# Patient Record
Sex: MALE | Employment: FULL TIME | URBAN - METROPOLITAN AREA
[De-identification: names, ages, dates, MRNs, and addresses within clinical notes are randomized per-mention and may not be internally consistent; named-entity substitution may affect disease eponyms.]

---

## 2017-10-07 ENCOUNTER — HOSPITAL ENCOUNTER (EMERGENCY)
Facility: CLINIC | Age: 27
Discharge: HOME | End: 2017-10-07
Attending: EMERGENCY MEDICINE | Admitting: EMERGENCY MEDICINE
Payer: COMMERCIAL

## 2017-10-07 VITALS
RESPIRATION RATE: 18 BRPM | TEMPERATURE: 98.1 F | OXYGEN SATURATION: 94 % | HEART RATE: 100 BPM | DIASTOLIC BLOOD PRESSURE: 80 MMHG | SYSTOLIC BLOOD PRESSURE: 105 MMHG
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
## 2017-10-07 DIAGNOSIS — T15.91XA FOREIGN BODY, EYE, RIGHT, INITIAL ENCOUNTER: ICD-10-CM

## 2017-10-07 PROCEDURE — 99283 EMERGENCY DEPT VISIT LOW MDM: CPT | Mod: Z6 | Performed by: EMERGENCY MEDICINE

## 2017-10-07 PROCEDURE — 99283 EMERGENCY DEPT VISIT LOW MDM: CPT | Performed by: EMERGENCY MEDICINE

## 2017-10-07 PROCEDURE — 25000125 ZZHC RX 250

## 2017-10-07 RX ORDER — MINERAL OIL, PETROLATUM 425; 573 MG/G; MG/G
OINTMENT OPHTHALMIC
Qty: 1 TUBE | Refills: 0 | Status: SHIPPED | OUTPATIENT
Start: 2017-10-07 | End: 2019-05-05

## 2017-10-07 RX ORDER — PROPARACAINE HYDROCHLORIDE 5 MG/ML
1 SOLUTION/ DROPS OPHTHALMIC ONCE
Status: COMPLETED | OUTPATIENT
Start: 2017-10-07 | End: 2017-10-07

## 2017-10-07 RX ORDER — PROPARACAINE HYDROCHLORIDE 5 MG/ML
SOLUTION/ DROPS OPHTHALMIC
Status: COMPLETED
Start: 2017-10-07 | End: 2017-10-07

## 2017-10-07 RX ADMIN — PROPARACAINE HYDROCHLORIDE 1 DROP: 5 SOLUTION/ DROPS OPHTHALMIC at 17:51

## 2017-10-07 ASSESSMENT — VISUAL ACUITY
OS: 20/20
OD: 20/20
OD: 20/20
OS: 20/20

## 2017-10-07 ASSESSMENT — ENCOUNTER SYMPTOMS: EYE PAIN: 1

## 2017-10-07 NOTE — ED AVS SNAPSHOT
[facility], Emergency Department    [address] [facility] [address]    Phone:  [phone]                                       Moshe Dee   MRN: [id number]    Department:  [facility], Emergency Department   Date of Visit:  10/7/2017           Patient Information     Date Of Birth          1990        Your diagnoses for this visit were:     Foreign body, eye, right, initial encounter        You were seen by Osorio Schwartz MD.        Discharge Instructions       Please make an appointment to follow up with Eye Clinic (phone: [phone]) in 2-3 days if not improving.    Return if worse eye pain or vision change.   Do not Rub your eye.      24 Hour Appointment Hotline       To make an appointment at any [facility] clinic, call [phone] ([phone]). If you don't have a family doctor or clinic, we will help you find one. [facility] clinics are conveniently located to serve the needs of you and your family.             Review of your medicines      START taking        Dose / Directions Last dose taken    REFRESH P.M. Oint   Quantity:  1 Tube        Use in Right eye three times a day to lubricate eye.   Refills:  0                Prescriptions were sent or printed at these locations (1 Prescription)                   Other Prescriptions                Printed at Department/Unit printer (1 of 1)         Artificial Tear Ointment (REFRESH P.M.) OINT                Orders Needing Specimen Collection     None      Pending Results     No orders found from 10/5/2017 to 10/8/2017.            Pending Culture Results     No orders found from 10/5/2017 to 10/8/2017.            Pending Results Instructions     If you had any lab results that were not finalized at the time of your Discharge, you can call the ED Lab Result RN at [phone]. You will be contacted by this team for any positive Lab results or changes in treatment. The nurses are available 7 days a week from 10A to 6:30P.  You  "can leave a message 24 hours per day and they will return your call.        Thank you for choosing [facility]       Thank you for choosing [facility] for your care. Our goal is always to provide you with excellent care. Hearing back from our patients is one way we can continue to improve our services. Please take a few minutes to complete the written survey that you may receive in the mail after you visit with us. Thank you!        [employer] Information     Yhat lets you send messages to your doctor, view your test results, renew your prescriptions, schedule appointments and more. To sign up, go to [email or web address] . Click on \"Log in\" on the left side of the screen, which will take you to the Welcome page. Then click on \"Sign up Now\" on the right side of the page.     You will be asked to enter the access code listed below, as well as some personal information. Please follow the directions to create your username and password.     Your access code is: [email or web address]  Expires: 2018  9:20 PM     Your access code will  in 90 days. If you need help or a new code, please call your [facility] clinic or [phone].        Care EveryWhere ID     This is your Care EveryWhere ID. This could be used by other organizations to access your [facility] medical records  [id number]        Equal Access to Services     LITA TOBIAS : Hadii so Frank, waaxda luqadaha, qaybta kaalmada adecourtneyyada, jorge a quintana . So [facility] [phone].    ATENCIÓN: Si habla español, tiene a luna disposición servicios gratuitos de asistencia lingüística. Llame al [phone].    We comply with applicable federal civil rights laws and [place] laws. We do not discriminate on the basis of race, color, national origin, age, disability, sex, sexual orientation, or gender identity.            After Visit Summary       This is your record. Keep this with you and show to your community pharmacist(s) and doctor(s) " at your next visit.

## 2017-10-07 NOTE — ED AVS SNAPSHOT
[facility], [place], Emergency Department    [address] [place] [address]    Phone:  [phone]                                       Moshe Dee   MRN: [id number]    Department:  [facility], Emergency Department   Date of Visit:  10/7/2017           After Visit Summary Signature Page     I have received my discharge instructions, and my questions have been answered. I have discussed any challenges I see with this plan with the nurse or doctor.    ..........................................................................................................................................  Patient/Patient Representative Signature      ..........................................................................................................................................  Patient Representative Print Name and Relationship to Patient    ..................................................               ................................................  Date                                            Time    ..........................................................................................................................................  Reviewed by Signature/Title    ...................................................              ..............................................  Date                                                            Time

## 2017-10-07 NOTE — ED NOTES
Pt comes in with right eye pain, feeling like there is something in his eye. Tried flushing eye out at home. Alert and oriented, vss. Denies vision changes.

## 2017-10-07 NOTE — ED PROVIDER NOTES
History     Chief Complaint   Patient presents with     Foreign Body in Eye     HPI  Moshe Dee is a 27 year old, otherwise health male who presents with right eye pain and foreign body sensation. Patient reports that he noticed the sensation in his right eye yesterday afternoon, he was not outside. He was unable to find anything in his eye. He notes some pain and no visual changes. He attempted to use warm water to wash out the eye, but was unsuccessful. He has had no drainage. He adds that the sensation moves around his eye.     History reviewed. No pertinent past medical history.    History reviewed. No pertinent surgical history.    Family History   Problem Relation Age of Onset     CANCER No family hx of      No known family hx of skin cancer       Social History   Substance Use Topics     Smoking status: Never Smoker     Smokeless tobacco: Never Used     Alcohol use Not on file       No current facility-administered medications for this encounter.      Current Outpatient Prescriptions   Medication     Artificial Tear Ointment (REFRESH P.M.) OINT      No Known Allergies      I have reviewed the Medications, Allergies, Past Medical and Surgical History, and Social History in the Epic system.    Review of Systems   Eyes: Positive for pain. Negative for visual disturbance.       Physical Exam   BP: 115/70  Heart Rate: 63  Temp: 98  F (36.7  C)  Resp: 16  SpO2: 94 %  Physical Exam   Constitutional: He is oriented to person, place, and time. He appears well-developed and well-nourished. No distress.   HENT:   Head: Normocephalic and atraumatic.   Eyes: EOM are normal. Pupils are equal, round, and reactive to light. Right conjunctiva is not injected. Left conjunctiva is not injected. No scleral icterus.   Slit lamp exam:       The right eye shows no corneal abrasion, no corneal ulcer, no foreign body and no fluorescein uptake.   No foreign body seen on eversion of upper and lower eyelid   Neck: Normal range of  motion. Neck supple.   Neurological: He is alert and oriented to person, place, and time.   Skin: Skin is warm and dry. No rash noted. He is not diaphoretic. No erythema. No pallor.       ED Course   5:25 PM  The patient was seen and examined by Joel Schwartz MD in Room 20.     ED Course     Procedures             Critical Care time:  none             Labs Ordered and Resulted from Time of ED Arrival Up to the Time of Departure from the ED - No data to display         Assessments & Plan (with Medical Decision Making)   The patient has a foreign body sensation in his right upper lid.  The sensation moves around but has not improved.  There is only trace fluorescein uptake on the surface of the cornea.  I was unable to see the foreign body.  After anesthetic with Alcaine dye was irrigated in his symptoms felt significantly better.  He was seen by ophthalmology who is here seeing another patient and they feel that he has likely only a residual feeling from the eye irritation and can use wetting ointment and follow-up as needed.    I have reviewed the nursing notes.    I have reviewed the findings, diagnosis, plan and need for follow up with the patient.    Discharge Medication List as of 10/7/2017  9:20 PM      START taking these medications    Details   Artificial Tear Ointment (REFRESH P.M.) OINT Use in Right eye three times a day to lubricate eye., Disp-1 Tube, R-0, Local Print             Final diagnoses:   Foreign body, eye, right, initial encounter   ILizzy, am serving as a trained medical scribe to document services personally performed by Joel Finn MD, based on the provider's statements to me.      IJoel MD, was physically present and have reviewed and verified the accuracy of this note documented by Lizzy Muhammad.       10/7/2017   [facility], [place], EMERGENCY DEPARTMENT     Osorio Schwartz MD  10/07/17 5729

## 2017-10-08 NOTE — CONSULTS
OPHTHALMOLOGY CONSULT NOTE  10/07/17    Patient: Moshe Dee  Consulted by: ED  Reason for Consult: foreign body sensation right eye     HISTORY OF PRESENTING ILLNESS:     Moshe Dee is a 27 year old male who presents today for foreign body sensation in right eye. Patient explains yesterday around 7 pm began having foreign body sensation in right eye. He tried rubbing his eye with/ mild relief and then went to sleep. Upon awakening foreign body sensation persisted and worsened throughout the day. Patient reports mild eye redness and light sensitivity during this time. He denies trauma, work with metals, or recalling any activity where something may have entered his eye. No other ocular history. Vision has been stable.  Attempts at irrigation have been unsuccessful in ED.           Review of systems were otherwise negative except for that which has been stated above.      OCULAR/MEDICAL/SURGICAL HISTORIES:     Past Ocular History:  None  Emmetropic     History reviewed. No pertinent past medical history.    History reviewed. No pertinent surgical history.    EXAMINATION:     Visual Acuity: Right Eye 20/20 ; Left Eye 20/20  Pupils: Equal and reactive to light and accomodation with no afferent pupillary defect.    Intraocular Pressure: RE  17 ; LE 17  mmHg by tonopen (<5% error).   Motility: RE Full; LE Full      External/Slit Lamp Exam   RIGHT EYE   Lids/Lashes: No Abnormality   Conj/Sclera: trace to 1 + diffuse injection. Small black debri noted on upper lid eversion (removed at slit lamp)    Cornea:  Linear punctate epithelial erosions over temporal cornea, no epithelial defect/staining.    Ant Chamber:  Deep and Quiet   Iris: Round and Reactive   Lens: Clear  LEFT   Lids/lashes: No Abnormality   Conj/Sclera: White and Quiet   Cornea:  Clear   Ant Chamber:  Deep and Quiet   Iris: Round and Reactive   Lens:Clear           ASSESSMENT/PLAN:     Moshe Dee is a 27 year old male who presents with foreign  body in right eye    Plan:  -small amount of foreign material noted under right lid removed at slit lamp. No epithelial defect.   -AT as needed  -AT ointment three times a day in right eye.    -Patient to return to clinic as needed if fails to improve or symptoms worsen.       It is our pleasure to participate in this patient's care and treatment. Please contact us with any further questions or concerns.        Rosa Herron MD  Ophthalmology PGY3

## 2017-10-08 NOTE — DISCHARGE INSTRUCTIONS
Please make an appointment to follow up with Eye Clinic (phone: [phone]) in 2-3 days if not improving.    Return if worse eye pain or vision change.   Do not Rub your eye.

## 2018-12-13 ENCOUNTER — PRE VISIT (OUTPATIENT)
Dept: UROLOGY | Facility: CLINIC | Age: 28
End: 2018-12-13

## 2018-12-13 NOTE — TELEPHONE ENCOUNTER
MEDICAL RECORDS REQUEST   [facility]  Urology Clinic  [address] [place] [address]  PHONE: [phone]  Fax: [phone]        FUTURE VISIT INFORMATION                                                   Moshe Dee, : 1990 scheduled for future visit at [facility]    APPOINTMENT INFORMATION:    Date: 2018    Provider:  GUTIERREZ FREGOSO    Reason for Visit/Diagnosis: FORESKIN TEAR    REFERRAL INFORMATION:    Referring provider:  SELF    Specialty: SELF    Referring providers clinic:  SELF    Clinic contact number:  SELF    RECORDS REQUESTED FOR VISIT                                                     NOTES  STATUS/DETAILS   OFFICE NOTE from referring provider  no   OFFICE NOTE from other specialist  no   DISCHARGE SUMMARY from hospital  no   DISCHARGE REPORT from the ER  no   OPERATIVE REPORT  no   MEDICATION LIST  no       PRE-VISIT CHECKLIST      Record collection complete Yes   Appointment appropriately scheduled           (right time/right provider) Yes   MyChart activation If no, please explain IN PROCESS   Questionnaire complete If no, please explain IN PROCESS     Completed by: Eden Rhodes

## 2018-12-18 ENCOUNTER — OFFICE VISIT (OUTPATIENT)
Dept: UROLOGY | Facility: CLINIC | Age: 28
End: 2018-12-18

## 2018-12-18 VITALS — BODY MASS INDEX: 22.4 KG/M2 | HEIGHT: 72 IN | WEIGHT: 165.34 LBS

## 2018-12-18 DIAGNOSIS — N47.8 FORESKIN PROBLEM: Primary | ICD-10-CM

## 2018-12-18 ASSESSMENT — ENCOUNTER SYMPTOMS
NECK MASS: 0
DECREASED APPETITE: 0
POLYDIPSIA: 0
SMELL DISTURBANCE: 0
POLYPHAGIA: 0
FEVER: 1
HOARSE VOICE: 0
TASTE DISTURBANCE: 0
WEIGHT GAIN: 0
CHILLS: 1
WEIGHT LOSS: 0
NIGHT SWEATS: 1
ALTERED TEMPERATURE REGULATION: 1
SINUS PAIN: 1
SINUS CONGESTION: 1
INCREASED ENERGY: 1
TROUBLE SWALLOWING: 1
HALLUCINATIONS: 0
FATIGUE: 1
SORE THROAT: 1

## 2018-12-18 ASSESSMENT — PAIN SCALES - GENERAL: PAINLEVEL: NO PAIN (0)

## 2018-12-18 ASSESSMENT — MIFFLIN-ST. JEOR: SCORE: 1768.75

## 2018-12-18 NOTE — LETTER
12/18/2018       RE: Moshe Dee  [address]  [address]  [facility] [address]     Dear Colleague,    Thank you for referring your patient, Moshe Dee, to the [facility] UROLOGY AND INST FOR PROSTATE AND UROLOGIC CANCERS at [facility]. Please see a copy of my visit note below.    Urology New Consult H&P    Name: Moshe Dee    MRN: [id number]   YOB: 1990                 Chief Complaint:   Torn penile frenulum          History of Present Illness:   Mr. Moshe Dee is a 28 year old male seen in consultation today  He reports a torn frenulum of the penis. This happened during sexual intercourse a week or two ago.  He is uncircumcised.  It is healing well.         Past Medical History:   none         Past Surgical History:   none         Social History:     Social History     Tobacco Use     Smoking status: Never Smoker     Smokeless tobacco: Never Used   Substance Use Topics     Alcohol use: Not on file            Family History:     Family History   Problem Relation Age of Onset     Cancer No family hx of         No known family hx of skin cancer              Allergies:   No Known Allergies         Medications:     Current Outpatient Medications   Medication Sig     Artificial Tear Ointment (REFRESH P.M.) OINT Use in Right eye three times a day to lubricate eye.     No current facility-administered medications for this visit.           Physical Exam:   Vitals reviewed  General: age-appropriate appearing male in NAD. normal body habitus.  HEENT: Head AT/NC, EOMI, CN Grossly intact  Resp: no respiratory distress, lung sounds clear.  CV: heart rate regular, S1, S2.  Lymph: No cervical, supraclavicular or axillary lymphadenopathy  Back: bony spine is non-tender, flanks are nontender  Abdomen: no obesity , soft, non-distended, non-tender. No organomegaly  : testicles normal without atrophy or masses and uncircumcised penis normal without urethral discharge -  healing frenulum tear. No induration, cellulitis  LE: no edema.   Neuro: grossly intact  Motor: excellent strength throughout  Skin: clear of rashes or ecchymoses.           Assessment and Plan:   28 year old male with torn frenulum  Healing well now.  I encouraged 2 more weeks to allow full healing - avoid sexual intercourse during this time.  followup if symptoms persist    Ko Hamilton MD  December 18, 2018

## 2018-12-18 NOTE — PROGRESS NOTES
Urology New Consult H&P    Name: Moshe Dee    MRN: [id number]   YOB: 1990                 Chief Complaint:   Torn penile frenulum          History of Present Illness:   Mr. Moshe Dee is a 28 year old male seen in consultation today  He reports a torn frenulum of the penis. This happened during sexual intercourse a week or two ago.  He is uncircumcised.  It is healing well.         Past Medical History:   none         Past Surgical History:   none         Social History:     Social History     Tobacco Use     Smoking status: Never Smoker     Smokeless tobacco: Never Used   Substance Use Topics     Alcohol use: Not on file            Family History:     Family History   Problem Relation Age of Onset     Cancer No family hx of         No known family hx of skin cancer              Allergies:   No Known Allergies         Medications:     Current Outpatient Medications   Medication Sig     Artificial Tear Ointment (REFRESH P.M.) OINT Use in Right eye three times a day to lubricate eye.     No current facility-administered medications for this visit.              Review of Systems:    ROS: 14 point ROS neg other than the symptoms noted above in the HPI and PMH.          Physical Exam:   Vitals reviewed  General: age-appropriate appearing male in NAD. normal body habitus.  HEENT: Head AT/NC, EOMI, CN Grossly intact  Resp: no respiratory distress, lung sounds clear.  CV: heart rate regular, S1, S2.  Lymph: No cervical, supraclavicular or axillary lymphadenopathy  Back: bony spine is non-tender, flanks are nontender  Abdomen: no obesity , soft, non-distended, non-tender. No organomegaly  : testicles normal without atrophy or masses and uncircumcised penis normal without urethral discharge - healing frenulum tear. No induration, cellulitis  LE: no edema.   Neuro: grossly intact  Motor: excellent strength throughout  Skin: clear of rashes or ecchymoses.           Assessment and Plan:   28 year old  male with torn frenulum  Healing well now.  I encouraged 2 more weeks to allow full healing - avoid sexual intercourse during this time.  followup if symptoms persist    Ko Hamilton MD  December 18, 2018

## 2019-05-05 ENCOUNTER — HOSPITAL ENCOUNTER (EMERGENCY)
Facility: CLINIC | Age: 29
Discharge: HOME | End: 2019-05-05
Attending: EMERGENCY MEDICINE | Admitting: EMERGENCY MEDICINE

## 2019-05-05 ENCOUNTER — APPOINTMENT (OUTPATIENT)
Dept: GENERAL RADIOLOGY | Facility: CLINIC | Age: 29
End: 2019-05-05
Attending: EMERGENCY MEDICINE

## 2019-05-05 VITALS
OXYGEN SATURATION: 99 % | HEART RATE: 67 BPM | SYSTOLIC BLOOD PRESSURE: 119 MMHG | DIASTOLIC BLOOD PRESSURE: 73 MMHG | WEIGHT: 165.34 LBS | RESPIRATION RATE: 12 BRPM | BODY MASS INDEX: 22.4 KG/M2 | TEMPERATURE: 98.3 F | HEIGHT: 72 IN

## 2019-05-05 DIAGNOSIS — J11.1 INFLUENZA-LIKE SYNDROME: ICD-10-CM

## 2019-05-05 DIAGNOSIS — J11.1 INFLUENZA-LIKE ILLNESS: ICD-10-CM

## 2019-05-05 LAB
DEPRECATED S PYO AG THROAT QL EIA: NORMAL
SPECIMEN SOURCE: NORMAL

## 2019-05-05 PROCEDURE — 87081 CULTURE SCREEN ONLY: CPT | Performed by: EMERGENCY MEDICINE

## 2019-05-05 PROCEDURE — 87880 STREP A ASSAY W/OPTIC: CPT | Performed by: EMERGENCY MEDICINE

## 2019-05-05 PROCEDURE — 99284 EMERGENCY DEPT VISIT MOD MDM: CPT | Mod: 25 | Performed by: EMERGENCY MEDICINE

## 2019-05-05 PROCEDURE — 99283 EMERGENCY DEPT VISIT LOW MDM: CPT | Mod: Z6 | Performed by: EMERGENCY MEDICINE

## 2019-05-05 PROCEDURE — 71046 X-RAY EXAM CHEST 2 VIEWS: CPT

## 2019-05-05 ASSESSMENT — ENCOUNTER SYMPTOMS
DIARRHEA: 0
NECK PAIN: 0
VOMITING: 0
COUGH: 1
HEADACHES: 1
DYSURIA: 0
ABDOMINAL PAIN: 0
SHORTNESS OF BREATH: 1
NAUSEA: 0
FEVER: 1
MYALGIAS: 1
SORE THROAT: 1
APPETITE CHANGE: 0
BLOOD IN STOOL: 0

## 2019-05-05 ASSESSMENT — MIFFLIN-ST. JEOR: SCORE: 1762.51

## 2019-05-05 NOTE — DISCHARGE INSTRUCTIONS
Please make an appointment to [facility] with [facility] (phone: [phone] in 5-7 days if not improving.    Use acetaminophen 1000 mg every 6 hours or ibuprofen 600 mg every 6 hours for pain control.      If you have any worsening symptoms, return to the emergency department for re-evaluation.

## 2019-05-05 NOTE — ED PROVIDER NOTES
[facility] EMERGENCY DEPARTMENT ([facility])  5/05/19    History     Chief Complaint   Patient presents with     Fever     Cough     The history is provided by the patient.     Moshe Dee is a 28 year old male with no significant past medical history who presents to the Emergency Department today due to subjective fevers and cough. Patient reports that he has had subjective fevers on/off since 4/30/2019.  He reports feeling much better 2 nights ago but not 100%. He woke up yesterday to worsening symptoms again. He reports he developed a cough, headache, myalgias and throat pain while coughing. He notes he has no pain while swallowing and is able to eat and drink without any difficulty.  He states that he has had some congestion. He denies nausea, vomiting, abdominal pain, diarrhea, blood in stool, dysuria, rash, change in appetite or neck pain. He denies smoking and no IVDU. Patient is concerned about his symptoms due to an upcoming business trip to [place]. Patient last took Ibuprofen for his symptoms around 8 AM.    I have reviewed the Medications, Allergies, Past Medical and Surgical History, and Social History in the [employer] system.    PAST MEDICAL HISTORY: History reviewed. No pertinent past medical history.    PAST SURGICAL HISTORY: History reviewed. No pertinent surgical history.    FAMILY HISTORY:   Family History   Problem Relation Age of Onset     Cancer No family hx of         No known family hx of skin cancer       SOCIAL HISTORY:   Social History     Tobacco Use     Smoking status: Never Smoker     Smokeless tobacco: Never Used   Substance Use Topics     Alcohol use: Yes     Alcohol/week: 0.0 oz     Comment: occ     No current facility-administered medications for this encounter.      No current outpatient medications on file.        Allergies   Allergen Reactions     Dayquil [Ra Daytime Cold-Flu] Anxiety     Nyquil Cold & [Night-Time Cold-Flu Relief] Anxiety         Review of Systems  "  Constitutional: Positive for fever (Subjective). Negative for appetite change.   HENT: Positive for congestion and sore throat (With cough).    Respiratory: Positive for cough and shortness of breath.    Gastrointestinal: Negative for abdominal pain, blood in stool, diarrhea, nausea and vomiting.   Genitourinary: Negative for dysuria.   Musculoskeletal: Positive for myalgias. Negative for neck pain.   Skin: Negative for rash.   Neurological: Positive for headaches.   All other systems reviewed and are negative.      Physical Exam   BP: 120/83  Pulse: 69  Heart Rate: 81  Temp: 98.1  F (36.7  C)  Resp: 15  Height: 182 cm (5' 11.65\")  Weight: 75 kg (165 lb 5.5 oz)  SpO2: 97 %      Physical Exam   General: patient is alert and oriented and in no acute distress   Head: atraumatic and normocephalic   EENT: moist mucus membranes with tonsillar erythema, no exudates, he has no peritonsillar swelling, no trismus, postnasal drainage noted in the posterior oropharynx, pupils round and reactive, TMs pearly gray bilaterally without erythema or bulging  Neck: supple with full ROM, no meningismus, no induration of the submandibular tissue  Cardiovascular: regular rate and rhythm, extremities warm and well perfused, no lower extremity edema  Pulmonary: lungs clear to auscultation bilaterally, symmetric without wheezing  Abdomen: soft, non-tender, nondistended  Musculoskeletal: normal range of motion   Neurological: alert and oriented, moving all extremities symmetrically, gait normal   Skin: warm, dry     ED Course   1:29 PM  The patient was seen and examined by Fern Sifuentes MD in Room ED03.        Procedures             Critical Care time:  none             Labs Ordered and Resulted from Time of ED Arrival Up to the Time of Departure from the ED - No data to display         Assessments & Plan (with Medical Decision Making)   Mr. Dee is a 28 year old male with no significant past medical history who presents to the Emergency " Department today due to subjective fevers and cough.  He is well-appearing, hemodynamically stable, afebrile and in no respiratory distress.  Differential diagnosis includes but is not limited to influenza-like illness, viral URI, strep pharyngitis, pneumonia.  He has full range of motion of his neck and does not have any evidence of peritonsillar abscess, deep space neck infection, retropharyngeal abscess or epiglottitis.  History and exam are not consistent with meningitis.  He did have a chest x-ray which is negative for pneumonia.  Strep swab is negative.  Will discharge to home with symptomatic management.  Patient given return instructions and voiced understanding.        I have reviewed the nursing notes.    I have reviewed the findings, diagnosis, plan and need for follow up with the patient.       Medication List      There are no discharge medications for this visit.         Final diagnoses:   Influenza-like illness     IPiyush, am serving as a trained medical scribe to document services personally performed by Fern Sifuentes MD, based on the provider's statements to me.   Fern TRACY MD, was physically present and have reviewed and verified the accuracy of this note documented by Piyush Card.    5/5/2019   [facility], [place], EMERGENCY DEPARTMENT     Fern Sifuentes MD  05/05/19 1271

## 2019-05-05 NOTE — ED AVS SNAPSHOT
[facility], [place], Emergency Department  [address] [address]  Phone:  [phone]                                    Moshe Dee   MRN: [id number]    Department:  [facility], Emergency Department   Date of Visit:  5/5/2019           After Visit Summary Signature Page    I have received my discharge instructions, and my questions have been answered. I have discussed any challenges I see with this plan with the nurse or doctor.    ..........................................................................................................................................  Patient/Patient Representative Signature      ..........................................................................................................................................  Patient Representative Print Name and Relationship to Patient    ..................................................               ................................................  Date                                   Time    ..........................................................................................................................................  Reviewed by Signature/Title    ...................................................              ..............................................  Date                                               Time          22EPIC Rev 08/18

## 2019-05-07 LAB
BACTERIA SPEC CULT: NORMAL
Lab: NORMAL
SPECIMEN SOURCE: NORMAL

## 2024-05-14 NOTE — ED TRIAGE NOTES
Presents with intermittent fevers, cough and pharyngitis for the past week. Patient states his throat only hurts when he coughs. Last ibuprofen dose @ 08:00 this morning.    Patient requesting to see MD before interventions ordered.    14-May-2024 12:02